# Patient Record
Sex: FEMALE | Race: WHITE
[De-identification: names, ages, dates, MRNs, and addresses within clinical notes are randomized per-mention and may not be internally consistent; named-entity substitution may affect disease eponyms.]

---

## 2018-08-13 ENCOUNTER — HOSPITAL ENCOUNTER (OUTPATIENT)
Dept: HOSPITAL 47 - RADECHMAIN | Age: 83
End: 2018-08-13
Attending: FAMILY MEDICINE
Payer: MEDICARE

## 2018-08-13 DIAGNOSIS — I27.20: ICD-10-CM

## 2018-08-13 DIAGNOSIS — I08.1: Primary | ICD-10-CM

## 2018-08-13 PROCEDURE — 93306 TTE W/DOPPLER COMPLETE: CPT

## 2018-08-14 NOTE — ECHOF
Referral Reason:R01.1 Heart murmur



MEASUREMENTS

--------

HEIGHT: 167.6 cm

WEIGHT: 82.1 kg

BP: 

RVIDd:   2.6 cm     (< 3.3)

IVSd:   1.3 cm     (0.6 - 1.1)

LVIDd:   3.4 cm     (3.9 - 5.3)

LVPWd:   1.2 cm     (0.6 - 1.1)

IVSs:   2.0 cm

LVIDs:   1.7 cm

LVPWs:   1.9 cm

LAESV Index (A-L):   17.86 ml/m

Ao Diam:   2.9 cm     (2.0 - 3.7)

AV Cusp:   1.2 cm     (1.5 - 2.6)

LA Diam:   3.2 cm     (2.7 - 3.8)

MV E Vahid:   0.69 m/s

MV DecT:   358 ms

MV A Vahid:   1.12 m/s

MV E/A Ratio:   0.61 

RAP:   5.00 mmHg

RVSP:   38.26 mmHg







FINDINGS

--------

Sinus rhythm.

This was a technically adequate study.

The left ventricular size is normal.   There is mild concentric left ventricular hypertrophy.   Overa
ll left ventricular systolic function is normal with, an EF between 55 - 60 %.

The right ventricle is normal in size and function.

Normal LA  size by volume 22+/-6 ml/m2.

The right atrium is normal in size.

Aortic valve is trileaflet and is mildly thickened.   There is no evidence of aortic regurgitation.  
 There is no evidence of aortic stenosis.

Moderate mitral annular calcification present.   There is trace to mild mitral regurgitation.

Mild tricuspid regurgitation present.   There is mild pulmonary hypertension.   The right ventricular
 systolic pressure, as measured by Doppler, is 38.26mmHg.

The pulmonic valve was not well visualized.

The aortic root size is normal.

Normal inferior vena cava with normal inspiratory collapse consistent with estimated right atrial pre
ssure of  5 mmHg.

There is no pericardial effusion.



CONCLUSIONS

--------

1. Sinus rhythm.

2. This was a technically adequate study.

3. The left ventricular size is normal.

4. There is mild concentric left ventricular hypertrophy.

5. Overall left ventricular systolic function is normal with, an EF between 55 - 60 %.

6. Normal LA size by volume 22+/-6 ml/m2.

7. Aortic valve is trileaflet and is mildly thickened.

8. Moderate mitral annular calcification present.

9. There is trace to mild mitral regurgitation.

10. Mild tricuspid regurgitation present.

11. There is mild pulmonary hypertension.

12. The right ventricular systolic pressure, as measured by Doppler, is 38.26mmHg.

13. The pulmonic valve was not well visualized.

14. The aortic root size is normal.

15. There is no pericardial effusion.





SONOGRAPHER: Carlo Calvert RDCS